# Patient Record
Sex: FEMALE | HISPANIC OR LATINO | Employment: OTHER | URBAN - METROPOLITAN AREA
[De-identification: names, ages, dates, MRNs, and addresses within clinical notes are randomized per-mention and may not be internally consistent; named-entity substitution may affect disease eponyms.]

---

## 2017-09-05 ENCOUNTER — OFFICE VISIT (OUTPATIENT)
Dept: URGENT CARE | Facility: CLINIC | Age: 57
End: 2017-09-05

## 2017-09-05 ENCOUNTER — NON-PROVIDER VISIT (OUTPATIENT)
Dept: URGENT CARE | Facility: CLINIC | Age: 57
End: 2017-09-05

## 2017-09-05 DIAGNOSIS — Z01.89 RESPIRATORY CLEARANCE EXAMINATION, ENCOUNTER FOR: ICD-10-CM

## 2017-09-05 PROCEDURE — 94375 RESPIRATORY FLOW VOLUME LOOP: CPT | Performed by: PHYSICIAN ASSISTANT

## 2017-09-05 NOTE — PROGRESS NOTES
Aaliyah Kenney is a 57 y.o. female here for a non-provider visit for Mask Fit/ Respiratory Clearance    If abnormal was an in office provider notified today (if so, indicate provider)? Yes  Routed to PCP? No

## 2017-12-22 ENCOUNTER — OFFICE VISIT (OUTPATIENT)
Dept: URGENT CARE | Facility: PHYSICIAN GROUP | Age: 57
End: 2017-12-22
Payer: COMMERCIAL

## 2017-12-22 VITALS
BODY MASS INDEX: 22.66 KG/M2 | DIASTOLIC BLOOD PRESSURE: 74 MMHG | TEMPERATURE: 98.7 F | WEIGHT: 120 LBS | SYSTOLIC BLOOD PRESSURE: 122 MMHG | RESPIRATION RATE: 16 BRPM | HEART RATE: 79 BPM | OXYGEN SATURATION: 97 % | HEIGHT: 61 IN

## 2017-12-22 DIAGNOSIS — J06.9 ACUTE URI: ICD-10-CM

## 2017-12-22 DIAGNOSIS — R05.9 COUGH: ICD-10-CM

## 2017-12-22 PROCEDURE — 99214 OFFICE O/P EST MOD 30 MIN: CPT | Performed by: FAMILY MEDICINE

## 2017-12-22 RX ORDER — AMOXICILLIN 500 MG/1
500 CAPSULE ORAL 3 TIMES DAILY
COMMUNITY
End: 2020-10-19

## 2017-12-22 RX ORDER — PROMETHAZINE HYDROCHLORIDE AND CODEINE PHOSPHATE 6.25; 1 MG/5ML; MG/5ML
5 SYRUP ORAL 4 TIMES DAILY PRN
Qty: 120 ML | Refills: 0 | Status: SHIPPED | OUTPATIENT
Start: 2017-12-22 | End: 2017-12-29

## 2017-12-22 ASSESSMENT — ENCOUNTER SYMPTOMS
WEIGHT LOSS: 0
EYE REDNESS: 0

## 2017-12-22 NOTE — PROGRESS NOTES
"Subjective:      Aaliyah Coronel is a 57 y.o. female who presents with Nasal Congestion; Fever; Chills; Generalized Body Aches; and Cough            4 days mostly dry cough, nasal congestion, mild ST. Subjective fever/chills improving. Myalgia. No HA. No SOB/wheeze. No PMH asthma/pneumonia. No other aggravating or alleviating factors..         Review of Systems   Constitutional: Positive for malaise/fatigue. Negative for weight loss.   HENT: Negative for ear discharge and ear pain.    Eyes: Negative for redness.   Skin: Negative for itching and rash.   .  Medications, Allergies, and current problem list reviewed today in Epic         Objective:     /74   Pulse 79   Temp 37.1 °C (98.7 °F)   Resp 16   Ht 1.537 m (5' 0.5\")   Wt 54.4 kg (120 lb)   SpO2 97%   BMI 23.05 kg/m²      Physical Exam   Constitutional: She appears well-developed and well-nourished. No distress.   HENT:   Head: Normocephalic and atraumatic.   Nasal congestion  Pharynx red without exudate     Eyes: Conjunctivae are normal.   Neck: Neck supple.   Cardiovascular: Normal rate, regular rhythm and normal heart sounds.    Pulmonary/Chest: Effort normal and breath sounds normal. She has no wheezes.   Lymphadenopathy:     She has no cervical adenopathy.   Neurological:   Speech is clear. Patient is appropriate and cooperative.     Skin: Skin is warm and dry. No rash noted.               Assessment/Plan:     1. Acute URI     2. Cough  promethazine-codeine (PHENERGAN-CODEINE) 6.25-10 MG/5ML Syrup     Differential diagnosis, natural history, supportive care, and indications for immediate follow-up discussed at length.     "

## 2017-12-22 NOTE — LETTER
December 22, 2017         Patient: Aaliyah Coronel   YOB: 1960   Date of Visit: 12/22/2017           To Whom it May Concern:    Aaliyah Coronel was seen in my clinic on 12/22/2017. Please excuse 12/21 and 12/22/2017.       Sincerely,           Patrick Wayne M.D.  Electronically Signed

## 2018-12-30 ENCOUNTER — OFFICE VISIT (OUTPATIENT)
Dept: URGENT CARE | Facility: PHYSICIAN GROUP | Age: 58
End: 2018-12-30
Payer: COMMERCIAL

## 2018-12-30 VITALS
BODY MASS INDEX: 26.32 KG/M2 | SYSTOLIC BLOOD PRESSURE: 116 MMHG | DIASTOLIC BLOOD PRESSURE: 82 MMHG | WEIGHT: 137 LBS | RESPIRATION RATE: 14 BRPM | TEMPERATURE: 97.8 F | HEART RATE: 78 BPM | OXYGEN SATURATION: 100 %

## 2018-12-30 DIAGNOSIS — K13.79 MOUTH PAIN: ICD-10-CM

## 2018-12-30 DIAGNOSIS — K04.7 DENTAL INFECTION: ICD-10-CM

## 2018-12-30 PROCEDURE — 99214 OFFICE O/P EST MOD 30 MIN: CPT | Performed by: NURSE PRACTITIONER

## 2018-12-30 RX ORDER — NAPROXEN 500 MG/1
500 TABLET ORAL 2 TIMES DAILY WITH MEALS
Qty: 10 TAB | Refills: 0 | Status: SHIPPED | OUTPATIENT
Start: 2018-12-30 | End: 2019-01-04

## 2018-12-30 RX ORDER — AMOXICILLIN AND CLAVULANATE POTASSIUM 875; 125 MG/1; MG/1
1 TABLET, FILM COATED ORAL 2 TIMES DAILY
Qty: 10 TAB | Refills: 0 | Status: SHIPPED | OUTPATIENT
Start: 2018-12-30 | End: 2019-01-04

## 2019-01-01 ASSESSMENT — ENCOUNTER SYMPTOMS
CHILLS: 0
DIAPHORESIS: 0
FEVER: 0
WEAKNESS: 0

## 2019-01-01 NOTE — PROGRESS NOTES
"Subjective:      Aaliyah Coronel is a 58 y.o. female who presents with Dental Pain (R lower side/ need pain meds for the pain)            Patient comes in today with complaint of a \"bad tooth\" with exposed gum and dental decay of a right molar.  She is scheduled to see a dentist on 1/5/19, but it just became very tender.  She is taking OTC tylenol and ibuprofen with minimal relief.  She denies any fever.  No history of dental abscess.          Review of Systems   Constitutional: Negative for chills, diaphoresis, fever and malaise/fatigue.   Neurological: Negative for weakness.     Medications, Allergies, and current problem list reviewed today in Epic     Objective:     /82   Pulse 78   Temp 36.6 °C (97.8 °F) (Temporal)   Resp 14   Wt 62.1 kg (137 lb)   SpO2 100%   BMI 26.32 kg/m²      Physical Exam   Constitutional: She is oriented to person, place, and time. She appears well-developed and well-nourished. No distress.   HENT:   Head: Normocephalic.   Nose: Nose normal.   Mouth/Throat: Oropharynx is clear and moist. No oropharyngeal exudate.   Right molar has noted decay with exposed gum.  No erythema or purulence.  No facial swelling.   Neck: Neck supple. No JVD present. No tracheal deviation present. No thyromegaly present.   Cardiovascular: Normal rate, regular rhythm and normal heart sounds.  Exam reveals no gallop and no friction rub.    No murmur heard.  Pulmonary/Chest: Effort normal and breath sounds normal. No stridor. No respiratory distress. She has no wheezes. She has no rales. She exhibits no tenderness.   Lymphadenopathy:     She has no cervical adenopathy.   Neurological: She is alert and oriented to person, place, and time.   Skin: Skin is warm and dry. No rash noted. She is not diaphoretic. No erythema.   Vitals reviewed.              Assessment/Plan:     1. Dental infection  - amoxicillin-clavulanate (AUGMENTIN) 875-125 MG Tab; Take 1 Tab by mouth 2 times a day for 5 days.  " Dispense: 10 Tab; Refill: 0    2. Mouth pain  - naproxen (NAPROSYN) 500 MG Tab; Take 1 Tab by mouth 2 times a day, with meals for 5 days.  Dispense: 10 Tab; Refill: 0    Discussed exam findings with patient.  Differential reviewed.    Take full course of antibiotics.  Naprosyn as prescribed.  Maintain adequate po hydration.  Follow up with dentist as scheduled, sooner if worse.  Patient verbalized understanding of and agreed with plan of care.

## 2019-03-07 ENCOUNTER — OFFICE VISIT (OUTPATIENT)
Dept: URGENT CARE | Facility: PHYSICIAN GROUP | Age: 59
End: 2019-03-07
Payer: COMMERCIAL

## 2019-03-07 VITALS
WEIGHT: 135 LBS | OXYGEN SATURATION: 98 % | RESPIRATION RATE: 16 BRPM | BODY MASS INDEX: 25.93 KG/M2 | SYSTOLIC BLOOD PRESSURE: 126 MMHG | TEMPERATURE: 97.8 F | HEART RATE: 85 BPM | DIASTOLIC BLOOD PRESSURE: 78 MMHG

## 2019-03-07 DIAGNOSIS — Z20.828 EXPOSURE TO INFLUENZA: ICD-10-CM

## 2019-03-07 DIAGNOSIS — R51.9 NONINTRACTABLE HEADACHE, UNSPECIFIED CHRONICITY PATTERN, UNSPECIFIED HEADACHE TYPE: ICD-10-CM

## 2019-03-07 LAB
FLUAV+FLUBV AG SPEC QL IA: NORMAL
INT CON NEG: NORMAL
INT CON POS: NORMAL

## 2019-03-07 PROCEDURE — 87804 INFLUENZA ASSAY W/OPTIC: CPT | Performed by: PHYSICIAN ASSISTANT

## 2019-03-07 PROCEDURE — 99213 OFFICE O/P EST LOW 20 MIN: CPT | Performed by: PHYSICIAN ASSISTANT

## 2019-03-07 NOTE — LETTER
March 7, 2019         Patient: Aaliyah Coronel   YOB: 1960   Date of Visit: 3/7/2019           To Whom it May Concern:    Aaliyah Coronel was seen in my clinic on 3/7/2019. She may return to work on 3/9/19.    If you have any questions or concerns, please don't hesitate to call.        Sincerely,           Nanyc Ortiz P.A.-C.  Electronically Signed

## 2019-03-08 NOTE — PROGRESS NOTES
Chief Complaint   Patient presents with   • Headache     x 1 day patient exposed to flu        HISTORY OF PRESENT ILLNESS: Patient is a 58 y.o. female who presents today for the following:    Chills started today  + HA  Denies cough, nasal congestion, ST  OTC meds tried: advil  Exposed to the flu     There are no active problems to display for this patient.      Allergies:Patient has no known allergies.    Current Outpatient Prescriptions Ordered in [facility]   Medication Sig Dispense Refill   • amoxicillin (AMOXIL) 500 MG Cap Take 500 mg by mouth 3 times a day.     • naproxen (NAPROSYN) 500 MG TABS Take 1 Tab by mouth 2 times a day, with meals. (Patient not taking: Reported on 12/30/2018) 15 Each 2   • methylPREDNISolone (MEDROL, CHRIS,) 4 MG tablet Take  by mouth. follow package directions (Patient not taking: Reported on 12/30/2018) 1 Kit 0   • ibuprofen (MOTRIN) 800 MG TABS Take 1 Tab by mouth every 8 hours as needed for Mild Pain. (Patient not taking: Reported on 12/30/2018) 30 Each 0     No current [facility]-ordered facility-administered medications on file.        Past Medical History:   Diagnosis Date   • Hypertension    • Hypothyroid        Social History   Substance Use Topics   • Smoking status: Never Smoker   • Smokeless tobacco: Never Used   • Alcohol use No       No family status information on file.   No family history on file.    Review of Systems:   Constitutional ROS: No unexpected change in weight, No weakness, No fatigue  Eye ROS: No recent significant change in vision, No eye pain, redness, discharge  Ear ROS: No drainage, No tinnitus or vertigo, No recent change in hearing  Mouth/Throat ROS: No teeth or gum problems, No bleeding gums, No tongue complaints  Neck ROS: No swollen glands, No significant pain in neck  Pulmonary ROS: No chronic cough, sputum, or hemoptysis, No dyspnea on exertion, No wheezing  Cardiovascular ROS: No diaphoresis, No edema, No palpitations  Gastrointestinal ROS: No change in bowel  habits, No significant change in appetite, No nausea, vomiting, diarrhea, or constipation  Musculoskeletal/Extremities ROS: No peripheral edema, No pain, redness or swelling on the joints  Hematologic/Lymphatic ROS: No chills, No night sweats, No weight loss  Skin/Integumentary ROS: No edema, No evidence of rash, No itching      Exam:  Blood pressure 126/78, pulse 85, temperature 36.6 °C (97.8 °F), temperature source Temporal, resp. rate 16, weight 61.2 kg (135 lb), SpO2 98 %.  General: Well developed, well nourished. No distress.  Eye: PERRL/EOMI; conjunctivae clear, lids normal.  ENMT: Lips without lesions, MMM. Oropharynx is clear. Bilateral TMs are within normal limits.  Pulmonary: Unlabored respiratory effort. Lungs clear to auscultation, no wheezes, no rhonchi.  Cardiovascular: Regular rate and rhythm without murmur.   Neurologic: Grossly nonfocal. No facial asymmetry noted.  Lymph: No cervical lymphadenopathy noted.  Skin: Warm, dry, good turgor. No rashes in visible areas.   Psych: Normal mood. Alert and oriented x3. Judgment and insight is normal.    Rapid influenza: negative    Assessment/Plan:  Discussed likely viral etiology. Discussed appropriate over-the-counter symptomatic medication, and when to return to clinic. Follow up for worsening or persistent symptoms.  1. Nonintractable headache, unspecified chronicity pattern, unspecified headache type  POCT Influenza A/B   2. Exposure to influenza  POCT Influenza A/B

## 2020-10-19 ENCOUNTER — OFFICE VISIT (OUTPATIENT)
Dept: URGENT CARE | Facility: PHYSICIAN GROUP | Age: 60
End: 2020-10-19
Payer: COMMERCIAL

## 2020-10-19 ENCOUNTER — HOSPITAL ENCOUNTER (OUTPATIENT)
Facility: MEDICAL CENTER | Age: 60
End: 2020-10-19
Attending: PHYSICIAN ASSISTANT
Payer: COMMERCIAL

## 2020-10-19 VITALS
HEART RATE: 79 BPM | HEIGHT: 61 IN | DIASTOLIC BLOOD PRESSURE: 78 MMHG | WEIGHT: 107.4 LBS | TEMPERATURE: 98.3 F | BODY MASS INDEX: 20.28 KG/M2 | RESPIRATION RATE: 16 BRPM | OXYGEN SATURATION: 93 % | SYSTOLIC BLOOD PRESSURE: 132 MMHG

## 2020-10-19 DIAGNOSIS — R68.89 FLU-LIKE SYMPTOMS: ICD-10-CM

## 2020-10-19 DIAGNOSIS — R43.8 SMELL, IMPAIRED: ICD-10-CM

## 2020-10-19 LAB
COVID ORDER STATUS COVID19: NORMAL
SARS-COV-2 RNA RESP QL NAA+PROBE: DETECTED
SPECIMEN SOURCE: ABNORMAL

## 2020-10-19 PROCEDURE — U0003 INFECTIOUS AGENT DETECTION BY NUCLEIC ACID (DNA OR RNA); SEVERE ACUTE RESPIRATORY SYNDROME CORONAVIRUS 2 (SARS-COV-2) (CORONAVIRUS DISEASE [COVID-19]), AMPLIFIED PROBE TECHNIQUE, MAKING USE OF HIGH THROUGHPUT TECHNOLOGIES AS DESCRIBED BY CMS-2020-01-R: HCPCS

## 2020-10-19 PROCEDURE — 99214 OFFICE O/P EST MOD 30 MIN: CPT | Performed by: PHYSICIAN ASSISTANT

## 2020-10-19 NOTE — LETTER
October 19, 2020         Patient: Aaliyah Coronel   YOB: 1960   Date of Visit: 10/19/2020           To Whom it May Concern:    Aaliyah Coronel was seen in my clinic on 10/19/2020.    A concern for COVID-19 has been identified and testing is in progress.  We are asking you to excuse absences while following self-isolation protocol per Center for Disease Control (CDC) guidelines.  Your employee will be able to access test results through our electronic delivery system called [employer].    If test results are negative, and once there has been no fever (temperature greater than 100.4 °F) for at least 24 hours without treatment, and no vomiting or diarrhea for at least 48 hours, then returned to work as approved.    If test results are positive then your employee will be contacted by the [facility] or [facility] for further instructions on duration of self-isolation and return to work protocol.  In general, this will also follow the CDC guidelines with a minimum of 10 days from the onset of symptoms and without fever, vomiting, or diarrhea as above.    In general, repeat testing is not necessary and not offered through our [facility].    This is the only note that will be provided from [facility] for this visit.  Your employee will require an appointment with a primary care provider if FMLA or disability forms are required.    If you have any questions or concerns, please don't hesitate to call.        Sincerely,           Nancy Ortiz P.A.-C.  Electronically Signed

## 2020-10-19 NOTE — PROGRESS NOTES
"Chief Complaint   Patient presents with   • Cough     cough and cold. Pt states that she feels as if she has the flu.        HISTORY OF PRESENT ILLNESS: Patient is a 60 y.o. female who presents today for the following:    Cough x 6 days  + fever, chills, body aches, decreased sense of smell  Denies SOB, nasal congestion, ear pain, ST, known COVID exposure  OTC meds today: none    There are no active problems to display for this patient.      Allergies:Patient has no known allergies.    No current [employer]-ordered outpatient medications on file.     No current [employer]-ordered facility-administered medications on file.        Past Medical History:   Diagnosis Date   • Hypertension    • Hypothyroid        Social History     Tobacco Use   • Smoking status: Never Smoker   • Smokeless tobacco: Never Used   Substance Use Topics   • Alcohol use: No   • Drug use: No       No family status information on file.   No family history on file.    Review of Systems:   Constitutional ROS: No unexpected change in weight, No weakness, No fatigue  Eye ROS: No recent significant change in vision, No eye pain, redness, discharge  Ear ROS: No drainage, No tinnitus or vertigo, No recent change in hearing  Mouth/Throat ROS: No teeth or gum problems, No bleeding gums, No tongue complaints  Neck ROS: No swollen glands, No significant pain in neck  Pulmonary ROS: No chronic cough, sputum, or hemoptysis, No dyspnea on exertion, No wheezing  Cardiovascular ROS: No diaphoresis, No edema, No palpitations  Musculoskeletal/Extremities ROS: No peripheral edema, No pain, redness or swelling on the joints  Hematologic/Lymphatic ROS: Positive for fever  Skin/Integumentary ROS: No edema, No evidence of rash, No itching      Exam:  /78   Pulse 79   Temp 36.8 °C (98.3 °F) (Temporal)   Resp 16   Ht 1.537 m (5' 0.5\")   Wt 48.7 kg (107 lb 6.4 oz)   SpO2 93%   General: Well developed, well nourished. No distress.    Eye: PERRL/EOMI; conjunctivae clear, " lids normal.  ENMT: Lips without lesions, MMM. Oropharynx is clear. Bilateral TMs are within normal limits.  Pulmonary: Unlabored respiratory effort. Lungs clear to auscultation, no wheezes, no rhonchi.   Cardiovascular: Regular rate and rhythm without murmur.   Neurologic: Grossly nonfocal. No facial asymmetry noted.  Lymph: No cervical lymphadenopathy noted.  Skin: Warm, dry, good turgor. No rashes in visible areas.   Psych: Normal mood. Alert and oriented to person, place and time.    Rapid influenza: Negative    Assessment/Plan:  Discussed likely viral etiology.  Negative influenza testing.  Patient will be tested for Covid and has been advised to quarantine until results are available.  Encouraged patient to sign [facility] Sign up information was sent via text message. Discussed appropriate over-the-counter symptomatic medication, and when to return to clinic. Follow up for worsening or persistent symptoms.  1. Flu-like symptoms  COVID/SARS COV-2 PCR   2. Smell, impaired  COVID/SARS COV-2 PCR

## 2020-10-20 NOTE — RESULT ENCOUNTER NOTE
Your COVID result came back positive.  You may return to work after a minimum of 10 days from the day your symptoms started AND no fever for 24 hours.  Let me know if you have any questions.    Mariluz

## 2023-08-07 ENCOUNTER — OFFICE VISIT (OUTPATIENT)
Dept: URGENT CARE | Facility: PHYSICIAN GROUP | Age: 63
End: 2023-08-07
Payer: COMMERCIAL

## 2023-08-07 VITALS
RESPIRATION RATE: 16 BRPM | HEIGHT: 61 IN | SYSTOLIC BLOOD PRESSURE: 124 MMHG | OXYGEN SATURATION: 96 % | TEMPERATURE: 98.5 F | DIASTOLIC BLOOD PRESSURE: 78 MMHG | HEART RATE: 93 BPM | WEIGHT: 107 LBS | BODY MASS INDEX: 20.2 KG/M2

## 2023-08-07 DIAGNOSIS — U07.1 COVID-19: ICD-10-CM

## 2023-08-07 LAB
FLUAV RNA SPEC QL NAA+PROBE: NEGATIVE
FLUBV RNA SPEC QL NAA+PROBE: NEGATIVE
RSV RNA SPEC QL NAA+PROBE: NEGATIVE
SARS-COV-2 RNA RESP QL NAA+PROBE: POSITIVE

## 2023-08-07 PROCEDURE — 99214 OFFICE O/P EST MOD 30 MIN: CPT | Performed by: NURSE PRACTITIONER

## 2023-08-07 PROCEDURE — 3074F SYST BP LT 130 MM HG: CPT | Performed by: NURSE PRACTITIONER

## 2023-08-07 PROCEDURE — 0241U POCT CEPHEID COV-2, FLU A/B, RSV - PCR: CPT | Performed by: NURSE PRACTITIONER

## 2023-08-07 PROCEDURE — 3078F DIAST BP <80 MM HG: CPT | Performed by: NURSE PRACTITIONER

## 2023-08-07 RX ORDER — BENZONATATE 100 MG/1
100 CAPSULE ORAL 3 TIMES DAILY PRN
Qty: 60 CAPSULE | Refills: 0 | Status: SHIPPED | OUTPATIENT
Start: 2023-08-07

## 2023-08-07 NOTE — PROGRESS NOTES
Chief Complaint   Patient presents with    Nasal Congestion     X yesterday with body aches, chills and fever.        HISTORY OF PRESENT ILLNESS: Patient is a pleasant 62 y.o. female who presents today due to symptoms which started two days ago. Pt reports a dry cough, rhinitis, sore throat, fever, chills, fatigue, malaise, and body aches. Denies chest pain, shortness of breath, or wheezing. Denies h/o asthma/copd/CAP. No immunocompromise. Has tried OTC cold medications without significant relief of symptoms. No recent ABX use. No other aggravating or alleviating factors. Reports unknown exposure to COVID-19.       There are no problems to display for this patient.      Allergies:Patient has no known allergies.    Current Outpatient Medications Ordered in Epic   Medication Sig Dispense Refill    benzonatate (TESSALON) 100 MG Cap Take 1 Capsule by mouth 3 times a day as needed for Cough. 60 Capsule 0     No current [facility]-ordered facility-administered medications on file.       Past Medical History:   Diagnosis Date    Hypertension     Hypothyroid        Social History     Tobacco Use    Smoking status: Never    Smokeless tobacco: Never   Vaping Use    Vaping Use: Never used   Substance Use Topics    Alcohol use: No    Drug use: No       No family status information on file.   History reviewed. No pertinent family history.    ROS:  Review of Systems   Constitutional: Positive for subjective fever, chills, fatigue, malaise. Negative for weight loss.  HENT: Positive for rhinitis, sore throat. Negative for ear pain, nosebleeds, and neck pain.    Eyes: Negative for vision changes.   Cardiovascular: Negative for chest pain, palpitations, orthopnea and leg swelling.   Respiratory: Positive for cough without sputum production. Negative for shortness of breath and wheezing.   Gastrointestinal:  Negative for abdominal pain, vomiting or diarrhea.   Skin: Negative for rash, diaphoresis.     Exam:  /78   Pulse 93   Temp  "36.9 °C (98.5 °F) (Temporal)   Resp 16   Ht 1.537 m (5' 0.5\")   Wt 48.5 kg (107 lb)   SpO2 96%   General: well-nourished, well-developed female in NAD  Head: normocephalic, atraumatic  Eyes: PERRLA, EOM within normal limits, no conjunctival injection, no scleral icterus, visual fields and acuity grossly intact.  Ears: normal shape and symmetry, no tenderness, no discharge. External canals are without any significant edema or erythema. Tympanic membranes are without any inflammation, no effusion. Gross auditory acuity is intact.  Nose: symmetrical without tenderness, mild discharge, erythema present bilateral nares.  Mouth/Throat: reasonable hygiene, no exudates or tonsillar enlargement. Mild erythema present.   Neck: no masses, range of motion within normal limits, no tracheal deviation.  Lymph: mild cervical adenopathy. No supraclavicular adenopathy.   Neuro: alert and oriented. Cranial nerves 1-12 grossly intact.   Cardiovascular: regular rate and rhythm without murmurs, rubs, or gallops. No edema.   Pulmonary: no distress. Chest is symmetrical with respiration. No wheezes, crackles, or rhonchi.   Musculoskeletal: appropriate muscle tone, gait is stable.  Skin: warm, dry, intact, no clubbing, no cyanosis.   Psych: appropriate mood, affect, judgement.       POC COVID positive          Assessment/Plan:  1. COVID-19  POCT CoV-2, Flu A/B, RSV by PCR    benzonatate (TESSALON) 100 MG Cap                Discussed symptoms sam. Home quarantine advised. Discussed natural progression and sx care.  Rest, increase fluids, hand and respiratory hygiene. May take OTC medications as directed for symptom relief. STRICT ER precautions.  Tessalon as needed for cough.  Supportive care, differential diagnoses, and indications for immediate follow-up discussed with patient.   Pathogenesis of diagnosis discussed including typical length and natural progression.  Instructed to return to clinic or nearest emergency department for any " change in condition, further concerns, or worsening of symptoms.  Patient states understanding of the plan of care and discharge instructions.          CHRISTAL Shaw.

## 2023-08-07 NOTE — LETTER
August 7, 2023         Patient: Aaliyah Coronel   YOB: 1960   Date of Visit: 8/7/2023           To Whom it May Concern:    Aaliyah Coronel was seen in my clinic on 8/7/2023. She may be excused from work for the next three days.   If you have any questions or concerns, please don't hesitate to call.        Sincerely,           CHRISTAL Shaw.  Electronically Signed

## 2024-08-21 ENCOUNTER — OFFICE VISIT (OUTPATIENT)
Dept: URGENT CARE | Facility: PHYSICIAN GROUP | Age: 64
End: 2024-08-21
Payer: COMMERCIAL

## 2024-08-21 VITALS
SYSTOLIC BLOOD PRESSURE: 118 MMHG | WEIGHT: 108 LBS | TEMPERATURE: 97.1 F | HEART RATE: 74 BPM | RESPIRATION RATE: 16 BRPM | DIASTOLIC BLOOD PRESSURE: 80 MMHG | HEIGHT: 61 IN | BODY MASS INDEX: 20.39 KG/M2 | OXYGEN SATURATION: 98 %

## 2024-08-21 DIAGNOSIS — Z20.822 EXPOSURE TO COVID-19 VIRUS: ICD-10-CM

## 2024-08-21 DIAGNOSIS — J06.9 VIRAL URI WITH COUGH: ICD-10-CM

## 2024-08-21 LAB
FLUAV RNA SPEC QL NAA+PROBE: NEGATIVE
FLUBV RNA SPEC QL NAA+PROBE: NEGATIVE
RSV RNA SPEC QL NAA+PROBE: NEGATIVE
SARS-COV-2 RNA RESP QL NAA+PROBE: NEGATIVE

## 2024-08-21 PROCEDURE — 99213 OFFICE O/P EST LOW 20 MIN: CPT | Performed by: NURSE PRACTITIONER

## 2024-08-21 PROCEDURE — 0241U POCT CEPHEID COV-2, FLU A/B, RSV - PCR: CPT | Performed by: NURSE PRACTITIONER

## 2024-08-21 PROCEDURE — 3074F SYST BP LT 130 MM HG: CPT | Performed by: NURSE PRACTITIONER

## 2024-08-21 PROCEDURE — 3079F DIAST BP 80-89 MM HG: CPT | Performed by: NURSE PRACTITIONER

## 2024-08-21 RX ORDER — AMOXICILLIN 500 MG/1
500 CAPSULE ORAL 3 TIMES DAILY
COMMUNITY

## 2024-08-21 ASSESSMENT — ENCOUNTER SYMPTOMS
NAUSEA: 0
DIARRHEA: 0
CHILLS: 1
CARDIOVASCULAR NEGATIVE: 1
COUGH: 1
VOMITING: 0
MYALGIAS: 1
SHORTNESS OF BREATH: 0
ABDOMINAL PAIN: 0
FEVER: 1
HEADACHES: 1
SORE THROAT: 1
WHEEZING: 0

## 2024-08-21 NOTE — LETTER
August 21, 2024       Patient: Aaliyah Coronel   YOB: 1960   Date of Visit: 8/21/2024         To Whom It May Concern:    In my medical opinion, I recommend that Aaliyah Coronel be excused from work on Saturday (8/24/2024) through Monday (8/26/2024) as she was evaluated in clinic today.     If you have any questions or concerns, please don't hesitate to call [phone]          Sincerely,          CHRISTAL Goetz.  Electronically Signed

## 2024-08-21 NOTE — PROGRESS NOTES
Subjective     Aaliyah Coronel is a 64 y.o. female who presents with Cough, Pharyngitis (X 3 days, coworker is positive for covid. Pt states she took one amoxicillin 500mg yesterday and one today), Fever, and Chills            Cough  Associated symptoms include chills, a fever, headaches, myalgias and a sore throat. Pertinent negatives include no shortness of breath or wheezing.   Pharyngitis   Associated symptoms include congestion, coughing and headaches. Pertinent negatives include no abdominal pain, diarrhea, shortness of breath or vomiting.   Fever   Associated symptoms include congestion, coughing, headaches and a sore throat. Pertinent negatives include no abdominal pain, diarrhea, nausea, vomiting or wheezing.   Chills  Associated symptoms include chills, congestion, coughing, a fever, headaches, myalgias and a sore throat. Pertinent negatives include no abdominal pain, nausea or vomiting.   Aaliyah has come into urgent care today as she has been experiencing upper respiratory symptoms since yesterday.  States was exposed to a coworker who was positive for COVID 2 days ago.  Taking over-the-counter DayQuil/NyQuil.  Experiencing nasal congestion, runny nose, sore throat, cough, headache, body aches and subjective fever.  Decreased appetite without vomiting or diarrhea.    PMH:  has a past medical history of Hypertension and Hypothyroid.    She has no past medical history of Asthma or Diabetes ([facility]).  MEDS:   Current Outpatient Medications:     amoxicillin (AMOXIL) 500 MG Cap, Take 500 mg by mouth 3 times a day., Disp: , Rfl:     benzonatate (TESSALON) 100 MG Cap, Take 1 Capsule by mouth 3 times a day as needed for Cough. (Patient not taking: Reported on 8/21/2024), Disp: 60 Capsule, Rfl: 0  ALLERGIES: No Known Allergies  SURGHX:   Past Surgical History:   Procedure Laterality Date    GYN SURGERY      hyster     SOCHX:  reports that she has never smoked. She has never used smokeless tobacco. She reports  "that she does not drink alcohol and does not use drugs.  FH: Family history was reviewed, no pertinent findings to report      Review of Systems   Constitutional:  Positive for chills, fever and malaise/fatigue.   HENT:  Positive for congestion and sore throat.    Respiratory:  Positive for cough. Negative for shortness of breath and wheezing.    Cardiovascular: Negative.    Gastrointestinal:  Negative for abdominal pain, diarrhea, nausea and vomiting.   Musculoskeletal:  Positive for myalgias.   Neurological:  Positive for headaches.   All other systems reviewed and are negative.             Objective     /80   Pulse 74   Temp 36.2 °C (97.1 °F) (Temporal)   Resp 16   Ht 1.537 m (5' 0.5\")   Wt 49 kg (108 lb)   SpO2 98%   BMI 20.75 kg/m²      Physical Exam  Vitals reviewed.   Constitutional:       General: She is awake. She is not in acute distress.     Appearance: Normal appearance. She is not ill-appearing, toxic-appearing or diaphoretic.   HENT:      Head: Normocephalic.      Right Ear: Tympanic membrane, ear canal and external ear normal.      Left Ear: Tympanic membrane, ear canal and external ear normal.      Nose: Nose normal.      Mouth/Throat:      Lips: Pink.      Mouth: Mucous membranes are dry.      Pharynx: Oropharynx is clear. Uvula midline.   Eyes:      Conjunctiva/sclera: Conjunctivae normal.   Cardiovascular:      Rate and Rhythm: Normal rate.   Pulmonary:      Effort: Pulmonary effort is normal.      Breath sounds: Normal breath sounds and air entry.   Skin:     General: Skin is warm and dry.   Neurological:      Mental Status: She is alert and oriented to person, place, and time.   Psychiatric:         Attention and Perception: Attention normal.         Mood and Affect: Mood normal.         Speech: Speech normal.         Behavior: Behavior normal. Behavior is cooperative.                             Assessment & Plan        Assessment & Plan  Exposure to COVID-19 virus    Orders:    " POCT CoV-2, Flu A/B, RSV by PCR    Viral URI with cough       -Maintain hydration/water intake  -May use over the counter Ibuprofen/Tylenol as needed for any fever, body aches or headache  -May use humidifier/vaporizer at home as needed for dry cough/nasal passages  -Gargle salt water or throat lozenges as needed for throat irritation/dry cough  -Get rest  -May use daily longer acting antihistamine as needed (no decongestant) for any post nasal drainage   -May use saline nasal spray/Flonase as needed to flush any nasal congestion/post nasal drainage   -Monitor for fevers, worse cough, phlegm, shortness of breath, wheeze, chest tightness- need re-evaluation

## 2024-09-13 ENCOUNTER — OFFICE VISIT (OUTPATIENT)
Dept: URGENT CARE | Facility: PHYSICIAN GROUP | Age: 64
End: 2024-09-13
Payer: COMMERCIAL

## 2024-09-13 VITALS
TEMPERATURE: 97.7 F | DIASTOLIC BLOOD PRESSURE: 74 MMHG | OXYGEN SATURATION: 95 % | SYSTOLIC BLOOD PRESSURE: 128 MMHG | BODY MASS INDEX: 20.39 KG/M2 | RESPIRATION RATE: 20 BRPM | HEIGHT: 61 IN | HEART RATE: 66 BPM | WEIGHT: 108 LBS

## 2024-09-13 DIAGNOSIS — Z20.822 CONTACT WITH AND (SUSPECTED) EXPOSURE TO COVID-19: ICD-10-CM

## 2024-09-13 DIAGNOSIS — B96.89 ACUTE BACTERIAL SINUSITIS: ICD-10-CM

## 2024-09-13 DIAGNOSIS — J01.90 ACUTE BACTERIAL SINUSITIS: ICD-10-CM

## 2024-09-13 PROCEDURE — 99213 OFFICE O/P EST LOW 20 MIN: CPT | Performed by: NURSE PRACTITIONER

## 2024-09-13 PROCEDURE — 3078F DIAST BP <80 MM HG: CPT | Performed by: NURSE PRACTITIONER

## 2024-09-13 PROCEDURE — 3074F SYST BP LT 130 MM HG: CPT | Performed by: NURSE PRACTITIONER

## 2024-09-13 PROCEDURE — 0241U POCT CEPHEID COV-2, FLU A/B, RSV - PCR: CPT | Performed by: NURSE PRACTITIONER

## 2024-09-13 RX ORDER — FLUTICASONE PROPIONATE 50 MCG
1 SPRAY, SUSPENSION (ML) NASAL DAILY
Qty: 16 G | Refills: 0 | Status: SHIPPED | OUTPATIENT
Start: 2024-09-13

## 2024-09-13 RX ORDER — AZITHROMYCIN 250 MG/1
TABLET, FILM COATED ORAL
Qty: 6 TABLET | Refills: 0 | Status: SHIPPED | OUTPATIENT
Start: 2024-09-13

## 2024-09-13 NOTE — LETTER
September 13, 2024    To Whom It May Concern:         This is confirmation that Aaliyah Coronel attended her scheduled appointment with PREMA Jackson on 9/13/24.         If you have any questions please do not hesitate to call me at the phone number listed below.    Sincerely,          CHRISTAL Jackson.  [phone]

## 2024-09-13 NOTE — LETTER
September 13, 2024    To Whom It May Concern:         This is confirmation that Aaliyah Coronel attended her scheduled appointment with PREMA Jackson on 9/13/24.          If you have any questions please do not hesitate to call me at the phone number listed below.    Sincerely,          CHRISTAL Jackson.  [phone]

## 2024-09-13 NOTE — PROGRESS NOTES
"Subjective:     Aaliyah Coronel is a 64 y.o. female who presents for Nasal Congestion (Pt was seen in aug dx flu pt sts she still has nasal congestion 4 weeks later )      HPI  Pt presents for evaluation of a new problem. Aaliyah is a very pleasant 64-year-old female presents urgent care today with complaints of ongoing nasal congestion and sinus pain x 1 month.  She states that she was diagnosed with the flu at the beginning of her symptoms.  The residual symptoms have improved however, she continues to have rhinorrhea and congestion.  She notes that she has recently been exposed to COVID and would like to be tested today.    ROS    PMH:   Past Medical History:   Diagnosis Date    Hypertension     Hypothyroid      ALLERGIES: No Known Allergies  SURGHX:   Past Surgical History:   Procedure Laterality Date    GYN SURGERY      hyster     SOCHX:   Social History     Socioeconomic History    Marital status:    Tobacco Use    Smoking status: Never    Smokeless tobacco: Never   Vaping Use    Vaping status: Never Used   Substance and Sexual Activity    Alcohol use: No    Drug use: No     FH: No family history on file.      Objective:   /74   Pulse 66   Temp 36.5 °C (97.7 °F) (Temporal)   Resp 20   Ht 1.537 m (5' 0.5\")   Wt 49 kg (108 lb)   SpO2 95%   BMI 20.75 kg/m²     Physical Exam  Vitals and nursing note reviewed.   Constitutional:       General: She is not in acute distress.     Appearance: Normal appearance. She is normal weight. She is not ill-appearing or toxic-appearing.   HENT:      Head: Normocephalic.      Right Ear: Tympanic membrane, ear canal and external ear normal.      Left Ear: Tympanic membrane, ear canal and external ear normal.      Nose: Congestion and rhinorrhea present.      Mouth/Throat:      Mouth: Mucous membranes are moist.      Pharynx: No oropharyngeal exudate or posterior oropharyngeal erythema.   Eyes:      General:         Right eye: No discharge.         Left " eye: No discharge.      Pupils: Pupils are equal, round, and reactive to light.   Cardiovascular:      Rate and Rhythm: Normal rate and regular rhythm.      Pulses: Normal pulses.      Heart sounds: Normal heart sounds.   Pulmonary:      Effort: Pulmonary effort is normal. No respiratory distress.      Breath sounds: No stridor. No wheezing, rhonchi or rales.   Chest:      Chest wall: No tenderness.   Abdominal:      General: Abdomen is flat.   Musculoskeletal:         General: Normal range of motion.      Cervical back: Normal range of motion and neck supple.   Skin:     General: Skin is dry.   Neurological:      General: No focal deficit present.      Mental Status: She is alert and oriented to person, place, and time. Mental status is at baseline.   Psychiatric:         Mood and Affect: Mood normal.         Behavior: Behavior normal.         Thought Content: Thought content normal.         Judgment: Judgment normal.         Assessment/Plan:   Assessment      1. Contact with and (suspected) exposure to covid-19  POCT CoV-2, Flu A/B, RSV by PCR      2. Acute bacterial sinusitis  fluticasone (FLONASE) 50 MCG/ACT nasal spray    azithromycin (ZITHROMAX) 250 MG Tab        Prescriptions called into pharmacy. AVS printed and reviewed with pt. Red flags identified of when to seek care back in UC or ER including SOB, increased fever and worsening symptoms. Symptomatic treatment such as OTC Ibuprofen/ Acetaminophen, increased fluids, and humidifier encouraged Pt in agreement with care plan today.

## 2025-06-16 ENCOUNTER — OFFICE VISIT (OUTPATIENT)
Dept: URGENT CARE | Facility: PHYSICIAN GROUP | Age: 65
End: 2025-06-16
Payer: COMMERCIAL

## 2025-06-16 VITALS
OXYGEN SATURATION: 95 % | TEMPERATURE: 97.1 F | SYSTOLIC BLOOD PRESSURE: 150 MMHG | WEIGHT: 108 LBS | HEART RATE: 78 BPM | HEIGHT: 61 IN | RESPIRATION RATE: 12 BRPM | BODY MASS INDEX: 20.39 KG/M2 | DIASTOLIC BLOOD PRESSURE: 84 MMHG

## 2025-06-16 DIAGNOSIS — Z00.00 HEALTH CARE MAINTENANCE: ICD-10-CM

## 2025-06-16 DIAGNOSIS — R03.0 ELEVATED BLOOD PRESSURE READING IN OFFICE WITHOUT DIAGNOSIS OF HYPERTENSION: ICD-10-CM

## 2025-06-16 DIAGNOSIS — R10.13 EPIGASTRIC ABDOMINAL PAIN: Primary | ICD-10-CM

## 2025-06-16 PROCEDURE — 99213 OFFICE O/P EST LOW 20 MIN: CPT | Performed by: NURSE PRACTITIONER

## 2025-06-16 PROCEDURE — 3079F DIAST BP 80-89 MM HG: CPT | Performed by: NURSE PRACTITIONER

## 2025-06-16 PROCEDURE — 3077F SYST BP >= 140 MM HG: CPT | Performed by: NURSE PRACTITIONER

## 2025-06-16 PROCEDURE — 93000 ELECTROCARDIOGRAM COMPLETE: CPT | Performed by: NURSE PRACTITIONER

## 2025-06-16 NOTE — PROGRESS NOTES
Chief Complaint   Patient presents with    Rib Pain     L side, x1day          History of Present Illness  The patient is a 64-year-old female who presents for left-sided abdominal pain.    She reports experiencing pain in the left upper quadrant of her abdomen, which she describes as being located just below her ribs. This discomfort began the previous night and is exacerbated by standing and walking but alleviated when she sits or bends over. The pain was severe enough to cause her to leave work. She has not sought any treatment for the pain and reports no trauma to the area. She also reports no recent illness, fever, body aches, nausea, vomiting, diarrhea, or constipation. She has not consumed any high-gas or spicy foods. Her diet included yogurt the previous night and coffee this morning, neither of which seemed to exacerbate the pain. She attempted to alleviate the pain with seltzer water, which provided some relief. She reports no recent black stools. She reports no excessive coughing, shortness of breath, or chest pain. This is her first experience with such symptoms. She reports no dysuria, urinary frequency, or urgency. She reports no radiation of the pain to her jaw or back. She does not have a primary care physician and does not consume alcohol. She experiences mild cramping due to hunger but does not report any unusual cramping. She has requested a note for work. She has experienced burping and flatulence but is uncertain if these are related to her current symptoms. She has not found relief from burping.    SOCIAL HISTORY  She does not drink alcohol.         ROS:    No severe shortness of breath   No Cardiac like chest pain, as discussed   As otherwise stated in HPI    Medical/SX/ Social History:  Reviewed per chart    Pertinent Medications:    Medications Ordered Prior to Encounter[1]     Allergies:    Patient has no known allergies.     Problem list, medications, and allergies reviewed by myself today  in Epic     Physical Exam:    Vitals:    06/16/25 1236   BP: (!) 150/84   Pulse: 78   Resp: 12   Temp: 36.2 °C (97.1 °F)   SpO2: 95%             Physical Exam  Constitutional:       General: She is not in acute distress.     Appearance: Normal appearance. She is well-developed and normal weight. She is not ill-appearing, toxic-appearing or diaphoretic.   HENT:      Head: Normocephalic and atraumatic.   Eyes:      General: Lids are normal. Gaze aligned appropriately. No allergic shiner or scleral icterus.     Extraocular Movements: Extraocular movements intact.      Conjunctiva/sclera: Conjunctivae normal.   Cardiovascular:      Rate and Rhythm: Normal rate and regular rhythm.      Pulses:           Radial pulses are 2+ on the right side and 2+ on the left side.      Heart sounds: Normal heart sounds.   Pulmonary:      Effort: Pulmonary effort is normal.      Breath sounds: Normal breath sounds and air entry. No decreased breath sounds, wheezing, rhonchi or rales.   Abdominal:      General: Abdomen is flat. Bowel sounds are normal.      Palpations: Abdomen is soft.      Tenderness: There is abdominal tenderness in the epigastric area. There is no guarding or rebound. Negative signs include Espinosa's sign and Rovsing's sign.   Musculoskeletal:      Right lower leg: No edema.      Left lower leg: No edema.   Skin:     General: Skin is warm.      Capillary Refill: Capillary refill takes less than 2 seconds.      Coloration: Skin is not cyanotic or pale.   Neurological:      Mental Status: She is alert and oriented to person, place, and time.      Gait: Gait is intact.   Psychiatric:         Behavior: Behavior normal. Behavior is cooperative.            Diagnostics:    EKG normal sinus rhythm consider left ventricular hypertrophy    Medical Decision making and plan :  I personally reviewed prior external notes and test results pertinent to today's visit. Pt is clinically stable at today's acute urgent care visit.  Patient  appears nontoxic with no acute distress noted. Appropriate for outpatient care at this time.    Pleasant 64 y.o. female presented clinic with:     Assessment & Plan  1. Left-sided abdominal pain.  - Pain onset last night, worsens with standing and walking, minimal pain when lying down or sitting.  Improvement with lying down is not consistent with kamille or myocarditis.  Denies any ripping or tearing sensations in chest or back, patient has had no fevers no signs of acute abdomen on exam.  Patient does not drink low suspicion for pancreatitis, negative for Murray Dejesus or [place] sign.  Overall HPI exam findings most consistent with gastritis or gastric bubble causing pain i.e. gas.  - Slight improvement with GI cocktail   -EKG shows sinus rhythm without any significant ST elevation, possible left ventricular hypertrophy,. Elevated blood pressure noted in clinic.  - Discussed potential gastrointestinal etiology, cardiac issues in women, and importance of ruling out cardiac cause.  Discussed seeking higher level care due to her symptoms patient did decline.  While exam findings are reassuring as pain is reproducible to palpation as well as GI cocktail did improve the pain.  Did discuss strict ER guidelines.  Did also discuss she needs to get in with her primary care to discuss elevated blood pressure.    -Discussed further workup for other etiologies patient did decline, patient requests  she monitor at home and obtain a work note.  - Advised rest, bland foods, avoid spicy or acidic foods, daily over-the-counter Prilosec for next few weeks, referral to primary care physician for hypertension management, provided work note, instructed to seek ER if pain worsens or new symptoms develop.      Shared decision-making was utilized with patient for treatment plan. Medication discussed included indication for use and the potential benefits and side effects. Education was provided regarding the aforementioned assessments.   Differential Diagnosis, natural history, and supportive care discussed. All of the patient's questions were answered to their satisfaction at the time of discharge. Patient was encouraged to monitor symptoms closely. Those signs and symptoms which would warrant concern and mandate seeking a higher level of service through the emergency department discussed at length.  Patient stated agreement and understanding of this plan of care.    Disposition:  Home in stable condition     Voice Recognition Disclaimer:  Portions of this document were created using voice recognition software and [employer] technology provided by [employer].  Patient was informed of [email or web address] technology being used.  The software does have a chance of producing errors of grammar and possibly content. I have made every reasonable attempt to correct obvious errors, but there could be errors of grammar and possibly content that I did not discover before finalizing the documentation.    Karina Corral A.P.R.N.        [1]   Current Outpatient Medications on File Prior to Visit   Medication Sig Dispense Refill    fluticasone (FLONASE) 50 MCG/ACT nasal spray Administer 1 Spray into affected nostril(S) every day. (Patient not taking: Reported on 6/16/2025) 16 g 0    azithromycin (ZITHROMAX) 250 MG Tab 2 tabs on day one, then 1 pill on day two, three, four and five. (Patient not taking: Reported on 6/16/2025) 6 Tablet 0    amoxicillin (AMOXIL) 500 MG Cap Take 500 mg by mouth 3 times a day. (Patient not taking: Reported on 6/16/2025)      benzonatate (TESSALON) 100 MG Cap Take 1 Capsule by mouth 3 times a day as needed for Cough. (Patient not taking: Reported on 6/16/2025) 60 Capsule 0     No current facility-administered medications on file prior to visit.

## 2025-06-16 NOTE — LETTER
[facility]  [facility] [address] [address]     June 16, 2025    Patient: Aaliyah Coronel   YOB: 1960   Date of Visit: 6/16/2025       To Whom It May Concern:    Aaliyah Coronel was seen and treated in our department on 6/16/2025. Please excuse from work today, 6/16/25.     Sincerely,     CHRISTAL Nash.

## 2025-06-20 ENCOUNTER — APPOINTMENT (OUTPATIENT)
Dept: RADIOLOGY | Facility: MEDICAL CENTER | Age: 65
End: 2025-06-20
Attending: EMERGENCY MEDICINE
Payer: COMMERCIAL

## 2025-06-20 ENCOUNTER — HOSPITAL ENCOUNTER (EMERGENCY)
Facility: MEDICAL CENTER | Age: 65
End: 2025-06-20
Attending: EMERGENCY MEDICINE
Payer: COMMERCIAL

## 2025-06-20 VITALS
BODY MASS INDEX: 20.45 KG/M2 | HEART RATE: 74 BPM | SYSTOLIC BLOOD PRESSURE: 183 MMHG | TEMPERATURE: 97.9 F | DIASTOLIC BLOOD PRESSURE: 84 MMHG | OXYGEN SATURATION: 95 % | WEIGHT: 106.48 LBS | RESPIRATION RATE: 16 BRPM

## 2025-06-20 DIAGNOSIS — E04.2 MULTINODULAR THYROID: ICD-10-CM

## 2025-06-20 DIAGNOSIS — R03.0 ELEVATED BLOOD PRESSURE READING: ICD-10-CM

## 2025-06-20 DIAGNOSIS — R07.9 CHEST PAIN, UNSPECIFIED TYPE: Primary | ICD-10-CM

## 2025-06-20 DIAGNOSIS — R10.12 LUQ ABDOMINAL PAIN: ICD-10-CM

## 2025-06-20 LAB
ALBUMIN SERPL BCP-MCNC: 4.2 G/DL (ref 3.2–4.9)
ALBUMIN/GLOB SERPL: 1.2 G/DL
ALP SERPL-CCNC: 158 U/L (ref 30–99)
ALT SERPL-CCNC: 32 U/L (ref 2–50)
ANION GAP SERPL CALC-SCNC: 11 MMOL/L (ref 7–16)
APPEARANCE UR: CLEAR
AST SERPL-CCNC: 26 U/L (ref 12–45)
BASOPHILS # BLD AUTO: 0.5 % (ref 0–1.8)
BASOPHILS # BLD: 0.02 K/UL (ref 0–0.12)
BILIRUB SERPL-MCNC: 0.6 MG/DL (ref 0.1–1.5)
BILIRUB UR QL STRIP.AUTO: NEGATIVE
BUN SERPL-MCNC: 12 MG/DL (ref 8–22)
CALCIUM ALBUM COR SERPL-MCNC: 9.3 MG/DL (ref 8.5–10.5)
CALCIUM SERPL-MCNC: 9.5 MG/DL (ref 8.5–10.5)
CHLORIDE SERPL-SCNC: 106 MMOL/L (ref 96–112)
CO2 SERPL-SCNC: 23 MMOL/L (ref 20–33)
COLOR UR: YELLOW
CREAT SERPL-MCNC: 0.54 MG/DL (ref 0.5–1.4)
D DIMER PPP IA.FEU-MCNC: <0.27 UG/ML (FEU) (ref 0–0.5)
EKG IMPRESSION: NORMAL
EOSINOPHIL # BLD AUTO: 0.04 K/UL (ref 0–0.51)
EOSINOPHIL NFR BLD: 0.9 % (ref 0–6.9)
ERYTHROCYTE [DISTWIDTH] IN BLOOD BY AUTOMATED COUNT: 39.7 FL (ref 35.9–50)
GFR SERPLBLD CREATININE-BSD FMLA CKD-EPI: 101 ML/MIN/1.73 M 2
GLOBULIN SER CALC-MCNC: 3.4 G/DL (ref 1.9–3.5)
GLUCOSE SERPL-MCNC: 95 MG/DL (ref 65–99)
GLUCOSE UR STRIP.AUTO-MCNC: NEGATIVE MG/DL
HCT VFR BLD AUTO: 43.5 % (ref 37–47)
HGB BLD-MCNC: 14 G/DL (ref 12–16)
IMM GRANULOCYTES # BLD AUTO: 0 K/UL (ref 0–0.11)
IMM GRANULOCYTES NFR BLD AUTO: 0 % (ref 0–0.9)
KETONES UR STRIP.AUTO-MCNC: NEGATIVE MG/DL
LEUKOCYTE ESTERASE UR QL STRIP.AUTO: NEGATIVE
LIPASE SERPL-CCNC: 16 U/L (ref 11–82)
LYMPHOCYTES # BLD AUTO: 1.69 K/UL (ref 1–4.8)
LYMPHOCYTES NFR BLD: 40 % (ref 22–41)
MCH RBC QN AUTO: 26.1 PG (ref 27–33)
MCHC RBC AUTO-ENTMCNC: 32.2 G/DL (ref 32.2–35.5)
MCV RBC AUTO: 81.2 FL (ref 81.4–97.8)
MICRO URNS: NORMAL
MONOCYTES # BLD AUTO: 0.43 K/UL (ref 0–0.85)
MONOCYTES NFR BLD AUTO: 10.2 % (ref 0–13.4)
NEUTROPHILS # BLD AUTO: 2.04 K/UL (ref 1.82–7.42)
NEUTROPHILS NFR BLD: 48.4 % (ref 44–72)
NITRITE UR QL STRIP.AUTO: NEGATIVE
NRBC # BLD AUTO: 0 K/UL
NRBC BLD-RTO: 0 /100 WBC (ref 0–0.2)
PH UR STRIP.AUTO: 6.5 [PH] (ref 5–8)
PLATELET # BLD AUTO: 268 K/UL (ref 164–446)
PMV BLD AUTO: 10.7 FL (ref 9–12.9)
POTASSIUM SERPL-SCNC: 3.8 MMOL/L (ref 3.6–5.5)
PROT SERPL-MCNC: 7.6 G/DL (ref 6–8.2)
PROT UR QL STRIP: NEGATIVE MG/DL
RBC # BLD AUTO: 5.36 M/UL (ref 4.2–5.4)
RBC UR QL AUTO: NEGATIVE
SODIUM SERPL-SCNC: 140 MMOL/L (ref 135–145)
SP GR UR STRIP.AUTO: 1.01
TROPONIN T SERPL-MCNC: <6 NG/L (ref 6–19)
UROBILINOGEN UR STRIP.AUTO-MCNC: 0.2 EU/DL
WBC # BLD AUTO: 4.2 K/UL (ref 4.8–10.8)

## 2025-06-20 PROCEDURE — 85025 COMPLETE CBC W/AUTO DIFF WBC: CPT

## 2025-06-20 PROCEDURE — 71045 X-RAY EXAM CHEST 1 VIEW: CPT

## 2025-06-20 PROCEDURE — 80053 COMPREHEN METABOLIC PANEL: CPT

## 2025-06-20 PROCEDURE — 84484 ASSAY OF TROPONIN QUANT: CPT

## 2025-06-20 PROCEDURE — 85379 FIBRIN DEGRADATION QUANT: CPT

## 2025-06-20 PROCEDURE — 76700 US EXAM ABDOM COMPLETE: CPT

## 2025-06-20 PROCEDURE — 36415 COLL VENOUS BLD VENIPUNCTURE: CPT

## 2025-06-20 PROCEDURE — 700117 HCHG RX CONTRAST REV CODE 255: Performed by: EMERGENCY MEDICINE

## 2025-06-20 PROCEDURE — 93005 ELECTROCARDIOGRAM TRACING: CPT | Mod: TC | Performed by: EMERGENCY MEDICINE

## 2025-06-20 PROCEDURE — 71260 CT THORAX DX C+: CPT

## 2025-06-20 PROCEDURE — 83690 ASSAY OF LIPASE: CPT

## 2025-06-20 PROCEDURE — 81003 URINALYSIS AUTO W/O SCOPE: CPT

## 2025-06-20 PROCEDURE — 99285 EMERGENCY DEPT VISIT HI MDM: CPT

## 2025-06-20 RX ORDER — CYCLOBENZAPRINE HCL 10 MG
10 TABLET ORAL 3 TIMES DAILY PRN
Qty: 15 TABLET | Refills: 1 | Status: SHIPPED | OUTPATIENT
Start: 2025-06-20

## 2025-06-20 RX ADMIN — IOHEXOL 75 ML: 350 INJECTION, SOLUTION INTRAVENOUS at 14:38

## 2025-06-20 ASSESSMENT — PAIN DESCRIPTION - PAIN TYPE: TYPE: ACUTE PAIN

## 2025-06-20 NOTE — ED PROVIDER NOTES
"ED Provider Note    CHIEF COMPLAINT  Chief Complaint   Patient presents with    LUQ Pain     Worse with movement. Was seen at  4 days ago. Pain continued       EXTERNAL RECORDS REVIEWED  Other outpatient notes from family medicine visit on 6/16    HPI/ROS  LIMITATION TO HISTORY   Select: : None    OUTSIDE HISTORIAN(S):  none    Aaliyah Coronel is a 64 y.o. female with history of hypertension and thyroid disease who presents for evaluation of left chest/upper quadrant pain.    Patient states she developed squeezing, sharp left upper quadrant pain that was gradual in onset 4 days ago.  She went to urgent care and was told it might be gas.  They recommended Mylanta/Maalox but this has not helped.  She states the pain is \"under my rib\", nonradiating, worse with standing and walking but not with laying on her left side or sitting up.  She denies back pain, fever, chills, cough, hemoptysis, nausea, vomiting, trauma.    PAST MEDICAL HISTORY   has a past medical history of Hypertension and Hypothyroid.    SURGICAL HISTORY   has a past surgical history that includes gyn surgery.    FAMILY HISTORY  No family history on file.    SOCIAL HISTORY  Social History     Tobacco Use    Smoking status: Never    Smokeless tobacco: Never   Vaping Use    Vaping status: Never Used   Substance and Sexual Activity    Alcohol use: No    Drug use: No    Sexual activity: Not on file       CURRENT MEDICATIONS  Home Medications       Reviewed by Elsy Curtis R.N. (Registered Nurse) on 06/20/25 at 0907  Med List Status: Partial     Medication Last Dose Status   amoxicillin (AMOXIL) 500 MG Cap  Active   azithromycin (ZITHROMAX) 250 MG Tab  Active   benzonatate (TESSALON) 100 MG Cap  Active   fluticasone (FLONASE) 50 MCG/ACT nasal spray  Active                    ALLERGIES  Allergies[1]    PHYSICAL EXAM  VITAL SIGNS: BP (!) 183/84   Pulse 74   Temp 36.6 °C (97.9 °F) (Temporal)   Resp 16   Wt 48.3 kg (106 lb 7.7 oz)   SpO2 95%  "  BMI 20.45 kg/m²    General:  WDWN female, nontoxic appearing in NAD; A+Ox3; V/S as above; hypertensive, afebrile, 92% O2 sat  Skin: warm and dry; good color; no rash  HEENT: NCAT; EOMs intact; PERRL; no scleral icterus   Neck: FROM  Cardiovascular: Regular heart rate and rhythm.  No murmurs, rubs, or gallops; pulses 2+ bilaterally radially   Lungs: No respiratory distress or tachypnea; Clear to auscultation with good air movement bilaterally.  No wheezes, rhonchi, or rales.  No rash, no crepitus, no rib tenderness  Abdomen: soft; NTND; no rebound, guarding, or rigidity.  No organomegaly or pulsatile mass; no CVAT; no mottling  Extremities: HATHAWAY x 4; no e/o trauma; no pedal edema; neg María's  Neurologic: CNs III-XII grossly intact; speech clear; distal sensation intact  Psychiatric: Appropriate affect, normal mood      EKG/LABS  Results for orders placed or performed during the hospital encounter of 06/20/25   CBC with Differential    Collection Time: 06/20/25  9:20 AM   Result Value Ref Range    WBC 4.2 (L) 4.8 - 10.8 K/uL    RBC 5.36 4.20 - 5.40 M/uL    Hemoglobin 14.0 12.0 - 16.0 g/dL    Hematocrit 43.5 37.0 - 47.0 %    MCV 81.2 (L) 81.4 - 97.8 fL    MCH 26.1 (L) 27.0 - 33.0 pg    MCHC 32.2 32.2 - 35.5 g/dL    RDW 39.7 35.9 - 50.0 fL    Platelet Count 268 164 - 446 K/uL    MPV 10.7 9.0 - 12.9 fL    Neutrophils-Polys 48.40 44.00 - 72.00 %    Lymphocytes 40.00 22.00 - 41.00 %    Monocytes 10.20 0.00 - 13.40 %    Eosinophils 0.90 0.00 - 6.90 %    Basophils 0.50 0.00 - 1.80 %    Immature Granulocytes 0.00 0.00 - 0.90 %    Nucleated RBC 0.00 0.00 - 0.20 /100 WBC    Neutrophils (Absolute) 2.04 1.82 - 7.42 K/uL    Lymphs (Absolute) 1.69 1.00 - 4.80 K/uL    Monos (Absolute) 0.43 0.00 - 0.85 K/uL    Eos (Absolute) 0.04 0.00 - 0.51 K/uL    Baso (Absolute) 0.02 0.00 - 0.12 K/uL    Immature Granulocytes (abs) 0.00 0.00 - 0.11 K/uL    NRBC (Absolute) 0.00 K/uL   Complete Metabolic Panel    Collection Time: 06/20/25  9:20 AM    Result Value Ref Range    Sodium 140 135 - 145 mmol/L    Potassium 3.8 3.6 - 5.5 mmol/L    Chloride 106 96 - 112 mmol/L    Co2 23 20 - 33 mmol/L    Anion Gap 11.0 7.0 - 16.0    Glucose 95 65 - 99 mg/dL    Bun 12 8 - 22 mg/dL    Creatinine 0.54 0.50 - 1.40 mg/dL    Calcium 9.5 8.5 - 10.5 mg/dL    Correct Calcium 9.3 8.5 - 10.5 mg/dL    AST(SGOT) 26 12 - 45 U/L    ALT(SGPT) 32 2 - 50 U/L    Alkaline Phosphatase 158 (H) 30 - 99 U/L    Total Bilirubin 0.6 0.1 - 1.5 mg/dL    Albumin 4.2 3.2 - 4.9 g/dL    Total Protein 7.6 6.0 - 8.2 g/dL    Globulin 3.4 1.9 - 3.5 g/dL    A-G Ratio 1.2 g/dL   Lipase    Collection Time: 25  9:20 AM   Result Value Ref Range    Lipase 16 11 - 82 U/L   ESTIMATED GFR    Collection Time: 25  9:20 AM   Result Value Ref Range    GFR (CKD-EPI) 101 >60 mL/min/1.73 m 2   D-DIMER    Collection Time: 25  9:20 AM   Result Value Ref Range    D-Dimer <0.27 0.00 - 0.50 ug/mL (FEU)   TROPONIN    Collection Time: 25  9:20 AM   Result Value Ref Range    Troponin T <6 6 - 19 ng/L   Urinalysis    Collection Time: 25  9:27 AM    Specimen: Urine   Result Value Ref Range    Color Yellow     Character Clear     Specific Gravity 1.010 <1.035    Ph 6.5 5.0 - 8.0    Glucose Negative Negative mg/dL    Ketones Negative Negative mg/dL    Protein Negative Negative mg/dL    Bilirubin Negative Negative    Urobilinogen, Urine 0.2 <=1.0 EU/dL    Nitrite Negative Negative    Leukocyte Esterase Negative Negative    Occult Blood Negative Negative    Micro Urine Req see below    EKG    Collection Time: 25  3:39 PM   Result Value Ref Range    Report       [facility] Emergency Dept.    Test Date:  2025  Pt Name:    NARENDRA BANKSMacyPETER           Department: ER  MRN:        [id number]                      Room:       [facility]  Gender:     Female                       Technician: [id number]  :        1960                   Requested By:HUDSON KIM  Order #:    [id number]                     Reading MD: HUDSON KIM MD    Measurements  Intervals                                Axis  Rate:       69                           P:          29  OK:         174                          QRS:        28  QRSD:       85                           T:          57  QT:         421  QTc:        451    Interpretive Statements  Sinus rhythm  Consider left ventricular hypertrophy  Minimal ST elevation, inferior leads  Compared to ECG 05/25/2010 16:30:35  ST (T wave) deviation now present  Electronically Signed On 06- 15:39:42 PDT by HUDSON KIM MD         I have independently interpreted this EKG which shows sinus rhythm without acute ST elevation    RADIOLOGY/PROCEDURES   I have independently interpreted the diagnostic imaging associated with this visit and am waiting the final reading from the radiologist.   My preliminary interpretation is as follows:   Chest x-ray shows no widened mediastinum, pneumothorax, pleural effusion    Radiologist interpretation:  CT-CHEST (THORAX) WITH   Final Result      1.  No acute abnormality.   2.  Enlarged multinodular thyroid extending into the superior mediastinum. No evidence of airway compromise.         US-ABDOMEN COMPLETE SURVEY   Final Result      Unremarkable abdominal ultrasound.         DX-CHEST-PORTABLE (1 VIEW)   Final Result      No acute cardiopulmonary disease evident.          COURSE & MEDICAL DECISION MAKING    ASSESSMENT, COURSE AND PLAN  Care Narrative: This is a 64-year-old female who presents with a 4-day history of left rib/left upper quadrant pain.  She is comfortable while lying down and asleep but the pain comes on when she is standing.  This is not exertional, however.  She is not having any associated symptoms that would suggest ACS, PE, or TAD.  Given my concern for a structural issue or space-occupying lesion, I obtained an ultrasound of the abdomen and a chest x-ray neither of which showed a mass or any abnormality.  I  then decided to get a CT thorax which did not demonstrate an etiology for her left-sided pain.  It did show an enlarged multinodular thyroid.  When I reevaluated the patient and advised her of these results, she is aware that she has thyroid nodules and they have been worked up and stable for years.  I still advised her to follow-up with a PCP regarding this and she will.  I advised her that we do not know what is causing her left-sided pain at this time.  She is amenable to trying ibuprofen, Tylenol, and a muscle relaxant and will return for worsening or ongoing symptoms.  She will establish care with a PCP.      DISPOSITION AND DISCUSSIONS  I have discussed management of the patient with the following physicians and SAUL's:  none    Discussion of management with other QHP or appropriate source(s): None     Escalation of care considered, and ultimately not performed:acute inpatient care management, however at this time, the patient is most appropriate for outpatient management    Barriers to care at this time, including but not limited to: Patient does not have established PCP.       FINAL DIAGNOSIS  1. Chest pain, unspecified type    2. LUQ abdominal pain    3. Multinodular thyroid    4. Elevated blood pressure reading         Electronically signed by: Geetha Biswas M.D., 6/20/2025 10:05 AM           [1] No Known Allergies

## 2025-06-20 NOTE — DISCHARGE INSTRUCTIONS
At this time we are unclear what is causing your left-sided pain.  Your cardiac testing and imaging today showed no definitive answers.  You may have muscular pain and for this reason we are recommending ibuprofen,Tylenol and a muscle relaxant.    Take Tylenol 650 mg every 4 hours as needed for pain    Take ibuprofen (also known as Advil or Motrin) 600 mg every 6 hours with food and water as needed for pain      Please return to the ER for worsening or ongoing symptoms.    Please follow-up with a primary care doctor.  A referral has been placed today in the system for you.

## 2025-06-20 NOTE — ED NOTES
Patient sitting in bed, position of comfort. Patient denies further needs at this time. Call bell within reach.

## 2025-06-20 NOTE — ED TRIAGE NOTES
Chief Complaint   Patient presents with    LUQ Pain     Worse with movement. Was seen at  4 days ago. Pain continued     BP (!) 172/90   Pulse 74   Temp 36.6 °C (97.9 °F) (Temporal)   Resp 16   Wt 48.3 kg (106 lb 7.7 oz)   SpO2 92%   Pt informed of wait times. Educated on triage process.  Asked to return to triage RN for any new or worsening of symptoms. Thanked for patience.